# Patient Record
Sex: FEMALE | Race: BLACK OR AFRICAN AMERICAN | NOT HISPANIC OR LATINO
[De-identification: names, ages, dates, MRNs, and addresses within clinical notes are randomized per-mention and may not be internally consistent; named-entity substitution may affect disease eponyms.]

---

## 2018-08-13 ENCOUNTER — APPOINTMENT (OUTPATIENT)
Dept: ULTRASOUND IMAGING | Facility: CLINIC | Age: 58
End: 2018-08-13

## 2019-10-25 ENCOUNTER — APPOINTMENT (OUTPATIENT)
Dept: VASCULAR SURGERY | Facility: CLINIC | Age: 59
End: 2019-10-25
Payer: COMMERCIAL

## 2019-10-25 VITALS
HEIGHT: 63.5 IN | SYSTOLIC BLOOD PRESSURE: 132 MMHG | WEIGHT: 205 LBS | DIASTOLIC BLOOD PRESSURE: 81 MMHG | TEMPERATURE: 98.8 F | HEART RATE: 69 BPM | BODY MASS INDEX: 35.87 KG/M2

## 2019-10-25 DIAGNOSIS — I89.0 LYMPHEDEMA, NOT ELSEWHERE CLASSIFIED: ICD-10-CM

## 2019-10-25 PROCEDURE — 93970 EXTREMITY STUDY: CPT

## 2019-10-25 PROCEDURE — 99203 OFFICE O/P NEW LOW 30 MIN: CPT

## 2019-10-25 NOTE — PHYSICAL EXAM
[Normal Breath Sounds] : Normal breath sounds [Normal Heart Sounds] : normal heart sounds [2+] : left 2+ [Ankle Swelling (On Exam)] : present [Ankle Swelling Bilaterally] : bilaterally  [de-identified] : awake, alert [de-identified] : no lad [FreeTextEntry1] : ++ lower ext pitting edema, sparing foot\par + hyperpigmentation, no ulceration [de-identified] : no gross motor/sensory deficits

## 2019-10-25 NOTE — HISTORY OF PRESENT ILLNESS
[FreeTextEntry1] : 59F, co bilat leg pain and swelling, worsening over the past 2 years.  It is relieved with leg elevation, and is worse at the end of the day after long periods of sitting or standing.  She is currently working, seated most of the day.  She occasionally wears compression, and admits it helps, but sometimes has too much discomfort to put them on.  She does require a lidocaine patch, as well as ibuprofen and gabapentin daily.\par \par She additionally has occasional low back pain that radiates to both legs.  She states she had an MRI previously and was recommended physical therapy, however she was unable to follow up.\par \par No smoking\par No hx dvt\par no hx trauma or lower ext surgery

## 2019-10-25 NOTE — ASSESSMENT
[FreeTextEntry1] : likely lymphedema.\par rec compression, elevation.  rx for stockings given, referral to tactile given\par pt requested pain management referral, given\par \par fu 6 months

## 2020-06-12 ENCOUNTER — APPOINTMENT (OUTPATIENT)
Dept: VASCULAR SURGERY | Facility: CLINIC | Age: 60
End: 2020-06-12

## 2020-11-11 ENCOUNTER — RESULT REVIEW (OUTPATIENT)
Age: 60
End: 2020-11-11

## 2021-03-19 ENCOUNTER — NON-APPOINTMENT (OUTPATIENT)
Age: 61
End: 2021-03-19

## 2021-05-20 ENCOUNTER — APPOINTMENT (OUTPATIENT)
Dept: PULMONOLOGY | Facility: CLINIC | Age: 61
End: 2021-05-20
Payer: COMMERCIAL

## 2021-05-20 VITALS
DIASTOLIC BLOOD PRESSURE: 77 MMHG | BODY MASS INDEX: 36.75 KG/M2 | TEMPERATURE: 98.3 F | WEIGHT: 210 LBS | SYSTOLIC BLOOD PRESSURE: 121 MMHG | HEIGHT: 63.5 IN | HEART RATE: 82 BPM | RESPIRATION RATE: 16 BRPM

## 2021-05-20 DIAGNOSIS — G47.33 OBSTRUCTIVE SLEEP APNEA (ADULT) (PEDIATRIC): ICD-10-CM

## 2021-05-20 DIAGNOSIS — Z78.9 OTHER SPECIFIED HEALTH STATUS: ICD-10-CM

## 2021-05-20 DIAGNOSIS — R25.2 CRAMP AND SPASM: ICD-10-CM

## 2021-05-20 DIAGNOSIS — R60.0 LOCALIZED EDEMA: ICD-10-CM

## 2021-05-20 DIAGNOSIS — G47.19 OTHER HYPERSOMNIA: ICD-10-CM

## 2021-05-20 PROCEDURE — 99204 OFFICE O/P NEW MOD 45 MIN: CPT

## 2021-05-20 RX ORDER — GABAPENTIN 600 MG/1
600 TABLET, COATED ORAL
Qty: 90 | Refills: 0 | Status: ACTIVE | COMMUNITY
Start: 2020-12-01

## 2021-05-20 NOTE — REVIEW OF SYSTEMS
[EDS: ESS=____] : daytime somnolence: ESS=[unfilled] [A.M. Dry Mouth] : a.m. dry mouth [Obesity] : obesity [A.M. Headache] : no headache present upon awakening [Depression] : no depression [Anxious] : not anxious [Difficulty Initiating Sleep] : no difficulty falling asleep [Difficulty Maintaining Sleep] : no difficulty maintaining sleep [Hypersomnolence] : sleeping much more than usual [Cataplexy] :  no cataplexy [Hypnogogic Hallucinations] : no hypnogogic hallucinations [Hypnopompic Hallucinations] : no hypnopompic hallucinations [Sleep Paralysis] : no sleep paralysis [Negative] : Gastrointestinal

## 2021-05-20 NOTE — PHYSICAL EXAM
[General Appearance - Well Developed] : well developed [Normal Appearance] : normal appearance [Well Groomed] : well groomed [General Appearance - Well Nourished] : well nourished [No Deformities] : no deformities [General Appearance - In No Acute Distress] : no acute distress [Normal Conjunctiva] : the conjunctiva exhibited no abnormalities [IV] : IV [Neck Appearance] : the appearance of the neck was normal [Apical Impulse] : the apical impulse was normal [Heart Rate And Rhythm] : heart rate was normal and rhythm regular [Heart Sounds] : normal S1 and S2 [Heart Sounds Gallop] : no gallops [] : no respiratory distress [Respiration, Rhythm And Depth] : normal respiratory rhythm and effort [Exaggerated Use Of Accessory Muscles For Inspiration] : no accessory muscle use [Auscultation Breath Sounds / Voice Sounds] : lungs were clear to auscultation bilaterally [Involuntary Movements] : no involuntary movements were seen [Nail Clubbing] : no clubbing of the fingernails [No Focal Deficits] : no focal deficits [Oriented To Time, Place, And Person] : oriented to person, place, and time [Impaired Insight] : insight and judgment were intact [Affect] : the affect was normal [Mood] : the mood was normal

## 2021-05-20 NOTE — HISTORY OF PRESENT ILLNESS
[FreeTextEntry1] : 60 year old female here fore evaluation of excessive daytime sleepiness\par \par Patient reports feeling tired when sitting for an extended period of time, sometimes dozing off while sitting on a chair and even has bumped her head against the wall. She has had a long-standing history of sleepiness but symptoms worsened over the last year during the pandemic, particularly after retiring. Reports sleeping 4 hours at night from 4 am to 8 am uninterrupted only being woken up by her alarm. Patient states that she stays up late at night watching TV and performing tasks. Prior to the pandemic, she slept around 1 AM and sleeping until 8 AM before going to work. Patient has gained 40 - 50 lbs over the past 4 years, 10 lbs within the past year.  Pt is unsure if she snores. In the morning, she endorses phlegm in the throat but denies headache or dry mouth. \par \par Comorbid medial conditions include sciatica, osteoarthritis, and chronic leg swelling. Patient takes gabapentin TID, and ibuprofen for pain. [Unintentional Sleep while Active] : no unintentional sleep while active [Unintentional Sleep While Inactive] : unintentional sleep while inactive [Awakes Unrefreshed] : awakening unrefreshed [Awakes with Headache] : headache upon awakening [Awakening With Dry Mouth] : awakening with dry mouth [Recent  Weight Gain] : recent weight gain [Daytime Somnolence] : daytime somnolence [ESS] : 9

## 2021-05-20 NOTE — ASSESSMENT
[FreeTextEntry1] : This is a 60 year old female here for evaluation of possible sleep apnea. The patient has multiple signs and symptoms of sleep-disordered breathing including obesity, daytime sleepiness, non-restorative. Insufficient sleep may also be playing a role. Advised to increase sleep time. She was referred to the [facility] for a diagnostic PSG. The ramifications of BILLY and its potential therapeutic modalities were discussed with the patient. The patient was cautioned on the importance of avoiding drowsy driving. She will follow up with us after the PSG.\par

## 2022-10-26 ENCOUNTER — EMERGENCY (EMERGENCY)
Facility: HOSPITAL | Age: 62
LOS: 1 days | Discharge: HOME | End: 2022-10-26
Attending: EMERGENCY MEDICINE
Payer: COMMERCIAL

## 2022-10-26 VITALS
WEIGHT: 210.1 LBS | TEMPERATURE: 99 F | SYSTOLIC BLOOD PRESSURE: 126 MMHG | HEIGHT: 63 IN | HEART RATE: 90 BPM | DIASTOLIC BLOOD PRESSURE: 81 MMHG | OXYGEN SATURATION: 96 % | RESPIRATION RATE: 18 BRPM

## 2022-10-26 PROCEDURE — 99284 EMERGENCY DEPT VISIT MOD MDM: CPT | Mod: 25

## 2022-10-26 PROCEDURE — 93971 EXTREMITY STUDY: CPT | Mod: 26,RT

## 2022-10-26 PROCEDURE — 93971 EXTREMITY STUDY: CPT

## 2022-10-26 PROCEDURE — 99284 EMERGENCY DEPT VISIT MOD MDM: CPT

## 2022-10-26 NOTE — ED PROVIDER NOTE - ATTENDING APP SHARED VISIT CONTRIBUTION OF CARE
Ken Vega MD:   I personally saw the patient and performed a substantive portion of the visit including all aspects of the medical decision making.    MDM: 62-year-old female with history of venous insufficiency who was sent to the ED from vascular specialist to rule out DVT of the right calf.  Patient with right calf pain and swelling for the last several weeks.  Patient with no fevers, chills, numbness, weakness, chest pain, shortness of breath, palpitations, dizziness.  No history of prior DVT.  On examination patient with tenderness to the right calf, mild edema to the right lower extremity.  Neurovascular intact distally.  Will obtain ultrasound to evaluate for DVT of the right lower extremity.  There are no symptoms or vital sign derangements to point towards PE

## 2022-10-26 NOTE — ED PROVIDER NOTE - NS ED ATTENDING STATEMENT MOD
This was a shared visit with the SAV. I reviewed and verified the documentation and independently performed the documented:

## 2022-10-26 NOTE — ED PROVIDER NOTE - OBJECTIVE STATEMENT
61 yo female with pmh venous insufficiency here from vascular doctors office for c/f DVT. Pt states for the past few weeks she has been experiencing R calf pain and swelling, has been using compression stockings with some relief, has started to see a vascular doctor d/t development of L medial distal tibial wound. Denies fevers, chills, recent long travel, hx of DVT, not on blood thinners.

## 2022-10-26 NOTE — ED PROVIDER NOTE - PHYSICAL EXAMINATION
A&Ox3, NAD, well appearing  Extremities: R calf with mild ttp, + nonpitting pedal edema, + superficial ulceration to the medial distal tibia (no surrounding erythema, no discharge/drainage or ttp. cap refill <2, pulses in distal extremities 4+    No focal Deficits, Strength 5/5 UE/LE. Gait steady.

## 2022-10-26 NOTE — ED ADULT NURSE NOTE - OBJECTIVE STATEMENT
Pt seen and d/c'ed by MARTHA Jackman for r/o DVT study. Imaging negative, pt d/c'ed by PA. No acute distress noted.

## 2022-10-26 NOTE — ED PROVIDER NOTE - NSFOLLOWUPINSTRUCTIONS_ED_ALL_ED_FT
- stay hydrated.   -take all home medications as prescribed  - take tylenol 975mg every 6 hours as needed for pain-take with meals.  - follow up with your vascular doctor within the next week. You may need to repeat the study in 5-7 days if your provider is still concerned for a blood clot.   - return if symptoms worsen, fever, weakness, numbness/tingling, difficulty ambulating and all other concerns.

## 2022-10-26 NOTE — ED PROVIDER NOTE - PATIENT PORTAL LINK FT
You can access the FollowMyHealth Patient Portal offered by [facility] by registering at the following website: [email or web address]. By joining [employer]’s FollowMyHealth portal, you will also be able to view your health information using other applications (apps) compatible with our system.

## 2022-11-14 ENCOUNTER — APPOINTMENT (OUTPATIENT)
Dept: VASCULAR SURGERY | Facility: CLINIC | Age: 62
End: 2022-11-14

## 2022-11-14 VITALS — SYSTOLIC BLOOD PRESSURE: 122 MMHG | DIASTOLIC BLOOD PRESSURE: 84 MMHG | TEMPERATURE: 98.4 F | HEART RATE: 141 BPM

## 2022-11-14 VITALS — WEIGHT: 225 LBS | HEIGHT: 63 IN | BODY MASS INDEX: 39.87 KG/M2

## 2022-11-14 PROCEDURE — 99204 OFFICE O/P NEW MOD 45 MIN: CPT

## 2022-11-14 PROCEDURE — 93970 EXTREMITY STUDY: CPT

## 2022-11-14 NOTE — PHYSICAL EXAM
[JVD] : no jugular venous distention  [Normal Breath Sounds] : Normal breath sounds [Normal Heart Sounds] : normal heart sounds [2+] : right 2+ [Ankle Swelling (On Exam)] : present [Ankle Swelling Bilaterally] : bilaterally  [Varicose Veins Of Lower Extremities] : not present [] : present [Ankle Swelling On The Left] : moderate [Abdomen Masses] : No abdominal masses

## 2022-11-14 NOTE — HISTORY OF PRESENT ILLNESS
[FreeTextEntry1] : Patient is a 62-year-old female with past medical history significant for hysterectomy in the past and obesity presenting to us for evaluation of bilateral lower extremity edema.  Patient denies any history of diabetes hypertension or coronary artery disease.  Patient had small ulceration in the right leg which healed with local wound care.  Patient has chronic venous insufficiency changes for a number of years.  No complaint of rest pain or claudication symptoms.

## 2022-11-14 NOTE — ASSESSMENT
[FreeTextEntry1] : Patient with bilateral lower extremity edema.\par \par No evidence of arterial insufficiency.\par \par No evidence of DVT or significant venous insufficiency.\par \par Recommend compression stockings and elevation.  Suspect lymphedema of lower extremity and therefore recommend medical management.\par \par This is all discussed with the patient in detail.